# Patient Record
Sex: MALE | Race: BLACK OR AFRICAN AMERICAN
[De-identification: names, ages, dates, MRNs, and addresses within clinical notes are randomized per-mention and may not be internally consistent; named-entity substitution may affect disease eponyms.]

---

## 2018-03-15 ENCOUNTER — HOSPITAL ENCOUNTER (INPATIENT)
Dept: HOSPITAL 12 - ER | Age: 58
LOS: 1 days | Discharge: HOME | DRG: 641 | End: 2018-03-16
Attending: INTERNAL MEDICINE | Admitting: INTERNAL MEDICINE
Payer: COMMERCIAL

## 2018-03-15 VITALS — HEIGHT: 71 IN | BODY MASS INDEX: 24.5 KG/M2 | WEIGHT: 175 LBS

## 2018-03-15 VITALS — DIASTOLIC BLOOD PRESSURE: 64 MMHG | SYSTOLIC BLOOD PRESSURE: 99 MMHG

## 2018-03-15 DIAGNOSIS — E78.5: ICD-10-CM

## 2018-03-15 DIAGNOSIS — B34.9: ICD-10-CM

## 2018-03-15 DIAGNOSIS — R42: ICD-10-CM

## 2018-03-15 DIAGNOSIS — E86.0: ICD-10-CM

## 2018-03-15 DIAGNOSIS — E87.6: Primary | ICD-10-CM

## 2018-03-15 LAB
ALP SERPL-CCNC: 61 U/L (ref 50–136)
ALT SERPL W/O P-5'-P-CCNC: 46 U/L (ref 16–63)
APPEARANCE UR: CLEAR
AST SERPL-CCNC: 21 U/L (ref 15–37)
BASOPHILS # BLD AUTO: 0 K/UL (ref 0–8)
BASOPHILS NFR BLD AUTO: 0.6 % (ref 0–2)
BILIRUB SERPL-MCNC: 0.4 MG/DL (ref 0.2–1)
BILIRUB UR QL STRIP: NEGATIVE
BUN SERPL-MCNC: 15 MG/DL (ref 7–18)
CHLORIDE SERPL-SCNC: 101 MMOL/L (ref 98–107)
CK SERPL-CCNC: 254 U/L (ref 39–308)
CO2 SERPL-SCNC: 26 MMOL/L (ref 21–32)
COLOR UR: (no result)
CREAT SERPL-MCNC: 1.2 MG/DL (ref 0.6–1.3)
DEPRECATED SQUAMOUS URNS QL MICRO: (no result) /HPF
EOSINOPHIL # BLD AUTO: 0 K/UL (ref 0–0.7)
EOSINOPHIL NFR BLD AUTO: 0.7 % (ref 0–7)
EOSINOPHIL NFR BLD MANUAL: 1 % (ref 0–8)
GLUCOSE SERPL-MCNC: 95 MG/DL (ref 74–106)
GLUCOSE UR STRIP-MCNC: NEGATIVE MG/DL
HCT VFR BLD AUTO: 43.3 % (ref 36.7–47.1)
HGB BLD-MCNC: 14.5 G/DL (ref 12.5–16.3)
HGB UR QL STRIP: NEGATIVE
KETONES UR STRIP-MCNC: NEGATIVE MG/DL
LEUKOCYTE ESTERASE UR QL STRIP: NEGATIVE
LYMPHOCYTES # BLD AUTO: 2.6 K/UL (ref 20–40)
LYMPHOCYTES NFR BLD AUTO: 39.2 % (ref 20.5–51.5)
LYMPHOCYTES NFR BLD MANUAL: 44 % (ref 20–40)
MCH RBC QN AUTO: 28.5 UUG (ref 23.8–33.4)
MCHC RBC AUTO-ENTMCNC: 34 G/DL (ref 32.5–36.3)
MCV RBC AUTO: 85 FL (ref 73–96.2)
MONOCYTES # BLD AUTO: 1.3 K/UL (ref 2–10)
MONOCYTES NFR BLD AUTO: 19.5 % (ref 0–11)
MONOCYTES NFR BLD MANUAL: 18 % (ref 2–10)
NEUTROPHILS # BLD AUTO: 2.7 K/UL (ref 1.8–8.9)
NEUTROPHILS NFR BLD AUTO: 40 % (ref 38.5–71.5)
NEUTS BAND NFR BLD MANUAL: 1 % (ref 0–10)
NEUTS SEG NFR BLD MANUAL: 36 % (ref 42–75)
NITRITE UR QL STRIP: NEGATIVE
PH UR STRIP: 7 [PH] (ref 5–8)
PLATELET # BLD AUTO: 224 K/UL (ref 152–348)
POTASSIUM SERPL-SCNC: 2.8 MMOL/L (ref 3.5–5.1)
PROT UR QL STRIP: NEGATIVE
RBC # BLD AUTO: 5.09 MIL/UL (ref 4.06–5.63)
RBC #/AREA URNS HPF: (no result) /HPF (ref 0–3)
SP GR UR STRIP: 1.01 (ref 1–1.03)
UROBILINOGEN UR STRIP-MCNC: 0.2 E.U./DL
WBC # BLD AUTO: 6.7 K/UL (ref 3.6–10.2)
WBC #/AREA URNS HPF: (no result) /HPF
WBC #/AREA URNS HPF: (no result) /HPF (ref 0–3)
WS STN SPEC: 7.9 G/DL (ref 6.4–8.2)

## 2018-03-15 PROCEDURE — A4663 DIALYSIS BLOOD PRESSURE CUFF: HCPCS

## 2018-03-15 NOTE — NUR
RECEIVED PATIENT IN BED ALERT ORIENTED, NO SOB NO CHEST PAIN, NO COMPLAIN OF PAIN, TAUGHT 
PATIENT THAT TO PRESS CALL LIGHT WHEN WANTED TO GO BATHROOM, ASSISTED WITH TOILETING, RYTHM 
SINUS RYTHM AT THIS TIME. CONT TO MONITOR.

## 2018-03-15 NOTE — NUR
labs drawn/sent, saline lock placed, 1l 0.9ns bolus infusing, monitor shows 
nsr, po2=99% on room air, family at bedside, ekg/cxr done.

## 2018-03-16 VITALS — SYSTOLIC BLOOD PRESSURE: 94 MMHG | DIASTOLIC BLOOD PRESSURE: 63 MMHG

## 2018-03-16 VITALS — SYSTOLIC BLOOD PRESSURE: 93 MMHG | DIASTOLIC BLOOD PRESSURE: 54 MMHG

## 2018-03-16 VITALS — SYSTOLIC BLOOD PRESSURE: 96 MMHG | DIASTOLIC BLOOD PRESSURE: 63 MMHG

## 2018-03-16 VITALS — DIASTOLIC BLOOD PRESSURE: 65 MMHG | SYSTOLIC BLOOD PRESSURE: 92 MMHG

## 2018-03-16 LAB
ALP SERPL-CCNC: 48 U/L (ref 50–136)
ALT SERPL W/O P-5'-P-CCNC: 33 U/L (ref 16–63)
AST SERPL-CCNC: 18 U/L (ref 15–37)
BASOPHILS # BLD AUTO: 0 K/UL (ref 0–8)
BASOPHILS NFR BLD AUTO: 0.2 % (ref 0–2)
BILIRUB SERPL-MCNC: 0.4 MG/DL (ref 0.2–1)
BUN SERPL-MCNC: 12 MG/DL (ref 7–18)
CHLORIDE SERPL-SCNC: 106 MMOL/L (ref 98–107)
CHOLEST SERPL-MCNC: 182 MG/DL (ref ?–200)
CO2 SERPL-SCNC: 29 MMOL/L (ref 21–32)
CREAT SERPL-MCNC: 1 MG/DL (ref 0.6–1.3)
EOSINOPHIL # BLD AUTO: 0.1 K/UL (ref 0–0.7)
EOSINOPHIL NFR BLD AUTO: 1.5 % (ref 0–7)
GLUCOSE SERPL-MCNC: 89 MG/DL (ref 74–106)
HCT VFR BLD AUTO: 38.5 % (ref 36.7–47.1)
HDLC SERPL-MCNC: 53 MG/DL (ref 40–60)
HGB BLD-MCNC: 12.9 G/DL (ref 12.5–16.3)
LYMPHOCYTES # BLD AUTO: 2.5 K/UL (ref 20–40)
LYMPHOCYTES NFR BLD AUTO: 48.9 % (ref 20.5–51.5)
MAGNESIUM SERPL-MCNC: 2.2 MG/DL (ref 1.8–2.4)
MCH RBC QN AUTO: 28.3 UUG (ref 23.8–33.4)
MCHC RBC AUTO-ENTMCNC: 33 G/DL (ref 32.5–36.3)
MCV RBC AUTO: 84.7 FL (ref 73–96.2)
MONOCYTES # BLD AUTO: 0.8 K/UL (ref 2–10)
MONOCYTES NFR BLD AUTO: 14.9 % (ref 0–11)
NEUTROPHILS # BLD AUTO: 1.8 K/UL (ref 1.8–8.9)
NEUTROPHILS NFR BLD AUTO: 34.5 % (ref 38.5–71.5)
PHOSPHATE SERPL-MCNC: 4.3 MG/DL (ref 2.5–4.9)
PLATELET # BLD AUTO: 204 K/UL (ref 152–348)
POTASSIUM SERPL-SCNC: 4.6 MMOL/L (ref 3.5–5.1)
RBC # BLD AUTO: 4.55 MIL/UL (ref 4.06–5.63)
TRIGL SERPL-MCNC: 87 MG/DL (ref 30–150)
TSH SERPL DL<=0.005 MIU/L-ACNC: 2.08 MIU/ML (ref 0.36–3.74)
WBC # BLD AUTO: 5.2 K/UL (ref 3.6–10.2)
WS STN SPEC: 6.3 G/DL (ref 6.4–8.2)

## 2018-03-16 NOTE — NUR
PATIENT RESTING COMFORTABLY IN BED AT THIS TIME. WILL MONITOR PATIENT'S IVF. PATIENT'S 
POTASSIUM LEVEL UP TO 4.6 AT THIS TIME. STABLE CONDITION, NO S/S OF DISTRESS. CALL LIGHT 
WITHIN REACH.

## 2018-03-16 NOTE — NUR
PT DISCHARGED PER MD ORDER, PT IN STABLE CONDITION, PT GIVEN DISCHARGE INSTRUCTIONS, 
REVIEWED MEDICATION, PT VERBALIZED UNDERSTANDING, PT DID NOT HAVE ANY OTHER QUESTIONS, ALL 
NEEDS MET, PT IV REMOVED. PT LEFT HOSPITAL VIA PRIVATE CAR WITH WIFE.

## 2018-03-16 NOTE — NUR
PATIENT SLEPT MOST OF THE NIGHT NO SOB NO CHEST PAIN, COMPLAIN OF HEAD AND GENERALIZED PAIN 
MEDICATED FOR PAIN, RYTHM SINUS MARYAM AT THIS TIME, ASSISTED WITH TOILETING, CALL LIGHT 
WITHIN REACH.

## 2018-03-16 NOTE — NUR
PATIENT SEEN BY MD, PENDING DISCHARGE. STABLE CONDITION AT THIS TIME, NO S/S OF DISTRESS. 
IVF DISCONTINUED. POTASSIUM LEVELS WNL AT 4.6.